# Patient Record
Sex: MALE | Race: WHITE | Employment: FULL TIME | URBAN - METROPOLITAN AREA
[De-identification: names, ages, dates, MRNs, and addresses within clinical notes are randomized per-mention and may not be internally consistent; named-entity substitution may affect disease eponyms.]

---

## 2017-02-25 ENCOUNTER — OFFICE VISIT (OUTPATIENT)
Dept: URGENT CARE | Facility: URGENT CARE | Age: 52
End: 2017-02-25
Payer: COMMERCIAL

## 2017-02-25 VITALS
HEART RATE: 76 BPM | SYSTOLIC BLOOD PRESSURE: 116 MMHG | WEIGHT: 168 LBS | DIASTOLIC BLOOD PRESSURE: 80 MMHG | OXYGEN SATURATION: 96 % | TEMPERATURE: 98.1 F

## 2017-02-25 DIAGNOSIS — J02.9 ACUTE PHARYNGITIS, UNSPECIFIED ETIOLOGY: ICD-10-CM

## 2017-02-25 DIAGNOSIS — H69.93 ETD (EUSTACHIAN TUBE DYSFUNCTION), BILATERAL: Primary | ICD-10-CM

## 2017-02-25 PROCEDURE — 99214 OFFICE O/P EST MOD 30 MIN: CPT | Performed by: PHYSICIAN ASSISTANT

## 2017-02-25 RX ORDER — CEPHALEXIN 500 MG/1
500 CAPSULE ORAL 4 TIMES DAILY
Qty: 40 CAPSULE | Refills: 0 | Status: SHIPPED | OUTPATIENT
Start: 2017-02-25

## 2017-02-25 NOTE — NURSING NOTE
Chief Complaint   Patient presents with     Ear Problem     rt ear discomfort for 5 days       Initial /80 (BP Location: Left arm, Patient Position: Chair, Cuff Size: Adult Regular)  Pulse 76  Temp 98.1  F (36.7  C) (Oral)  Wt 168 lb (76.2 kg)  SpO2 96% There is no height or weight on file to calculate BMI.  Medication Reconciliation: complete   Vianey COTA

## 2017-02-25 NOTE — MR AVS SNAPSHOT
"              After Visit Summary   2017    Keenan Cuevas    MRN: [id number]           Patient Information     Date Of Birth          1965        Visit Information        Provider Department      2017 2:45 PM Hayden Nelson PA-C [facility]        Today's Diagnoses     ETD (eustachian tube dysfunction), bilateral    -  1    Acute pharyngitis, unspecified etiology           Follow-ups after your visit        Who to contact     If you have questions or need follow up information about today's clinic visit or your schedule please contact [facility] directly at [phone].  Normal or non-critical lab and imaging results will be communicated to you by MyChart, letter or phone within 4 business days after the clinic has received the results. If you do not hear from us within 7 days, please contact the clinic through MyChart or phone. If you have a critical or abnormal lab result, we will notify you by phone as soon as possible.  Submit refill requests through [employer] or call your pharmacy and they will forward the refill request to us. Please allow 3 business days for your refill to be completed.          Additional Information About Your Visit        MyChart Information     [employer] lets you send messages to your doctor, view your test results, renew your prescriptions, schedule appointments and more. To sign up, go to [email or web address] . Click on \"Log in\" on the left side of the screen, which will take you to the Welcome page. Then click on \"Sign up Now\" on the right side of the page.     You will be asked to enter the access code listed below, as well as some personal information. Please follow the directions to create your username and password.     Your access code is: [email or web address]  Expires: 6/3/2017 10:56 PM     Your access code will  in 90 days. If you need help or a new code, please call your [facility] clinic or [phone].      "   Care EveryWhere ID     This is your Care EveryWhere ID. This could be used by other organizations to access your [facility] medical records  [id number]        Your Vitals Were     Pulse Temperature Pulse Oximetry             76 98.1  F (36.7  C) (Oral) 96%          Blood Pressure from Last 3 Encounters:   02/25/17 116/80   03/18/03 120/70    Weight from Last 3 Encounters:   02/25/17 168 lb (76.2 kg)   03/18/03 165 lb (74.8 kg)              Today, you had the following     No orders found for display         Today's Medication Changes          These changes are accurate as of: 2/25/17 11:59 PM.  If you have any questions, ask your nurse or doctor.               Start taking these medicines.        Dose/Directions    cephALEXin 500 MG capsule   Commonly known as:  KEFLEX   Used for:  Acute pharyngitis, unspecified etiology   Started by:  Hayden Nelson PA-C        Dose:  500 mg   Take 1 capsule (500 mg) by mouth 4 times daily   Quantity:  40 capsule   Refills:  0            Where to get your medicines      These medications were sent to [id number] [address] [address]     Phone:  [phone]     cephALEXin 500 MG capsule                Primary Care Provider    None Specified       No primary provider on file.        Thank you!     Thank you for choosing [facility]  for your care. Our goal is always to provide you with excellent care. Hearing back from our patients is one way we can continue to improve our services. Please take a few minutes to complete the written survey that you may receive in the mail after your visit with us. Thank you!             Your Updated Medication List - Protect others around you: Learn how to safely use, store and throw away your medicines at www.disposemymeds.org.          This list is accurate as of: 2/25/17 11:59 PM.  Always use your most recent med list.                   Brand Name  Dispense Instructions for use    cephALEXin 500 MG capsule    KEFLEX    40 capsule    Take 1 capsule (500 mg) by mouth 4 times daily       desipramine 10 MG tablet    NORPRAMIN    45 tablet    Take 1 tablet by mouth At Bedtime. Take 1-2 at bedtime for sleep       LOVASTATIN PO      Reported on 2/25/2017       OXYCODONE HCL      Reported on 2/25/2017       PRILOSEC PO      Reported on 2/25/2017       ROBITUSSIN A-C  MG/5ML Syrp   Generic drug:  CODEINE-GUAIFENESIN     4 OZ    2 TSPS PO Q4-6 HR PRN COUGH       VIREAD PO

## 2017-03-06 NOTE — PROGRESS NOTES
SUBJECTIVE:  Keenan Cuevas is a 51 year old male who presents with bilateral ear pain, fullness and pressure for 5 day(s).   Severity: mild   Timing:gradual onset  Additional symptoms include headache, post-nasal drip and sore throat.      History of recurrent otitis: no    Past Medical History   Diagnosis Date     Hep C w/o coma, chronic (H)      Current Outpatient Prescriptions   Medication Sig Dispense Refill     Tenofovir Disoproxil Fumarate (VIREAD PO)        cephALEXin (KEFLEX) 500 MG capsule Take 1 capsule (500 mg) by mouth 4 times daily 40 capsule 0     desipramine (NOPRAMIN) 10 MG tablet Take 1 tablet by mouth At Bedtime. Take 1-2 at bedtime for sleep (Patient not taking: Reported on 2/25/2017) 45 tablet 1     OXYCODONE HCL Reported on 2/25/2017       Omeprazole (PRILOSEC PO) Reported on 2/25/2017       LOVASTATIN PO Reported on 2/25/2017       ROBITUSSIN A-C  MG/5ML OR SYRP 2 TSPS PO Q4-6 HR PRN COUGH (Patient not taking: Reported on 2/25/2017) 4 OZ 1     Social History   Substance Use Topics     Smoking status: Current Some Day Smoker     Smokeless tobacco: Never Used      Comment: occ     Alcohol use Yes      Comment: occ       ROS:   Review of systems negative except as stated above.    OBJECTIVE:  /80 (BP Location: Left arm, Patient Position: Chair, Cuff Size: Adult Regular)  Pulse 76  Temp 98.1  F (36.7  C) (Oral)  Wt 168 lb (76.2 kg)  SpO2 96%   EXAM:  The right TM is gabe colored and distorted light reflex     The right auditory canal is normal and without drainage, edema or erythema  The left TM is gabe colored and distorted light reflex  The left auditory canal is normal and without drainage, edema or erythema  Oropharynx exam is normal: no lesions, erythema, adenopathy or exudate.  GENERAL: no acute distress  EYES: EOMI,  PERRL, conjunctiva clear  NECK: supple, non-tender to palpation, no adenopathy noted  RESP: lungs clear to auscultation - no rales, rhonchi or wheezes  CV:  regular rates and rhythm, normal S1 S2, no murmur noted  SKIN: no suspicious lesions or rashes     ASSESSMENT:  Eustachin tube dysfunction and URI    PLAN:    Sugessted increased rest, increased fluids and bedside humidification for comfort.  OTC tylenol and Ibuprofen for analgesia and antipyretic.  Dosed by packaging directions and weight .  Antibiotic Keflex as written.  Follow up with Primary Care Provider if any complications or sequelae present.

## 2019-10-11 ENCOUNTER — MEDICAL CORRESPONDENCE (OUTPATIENT)
Dept: HEALTH INFORMATION MANAGEMENT | Facility: CLINIC | Age: 54
End: 2019-10-11

## 2019-10-15 ENCOUNTER — HOSPITAL ENCOUNTER (OUTPATIENT)
Dept: MRI IMAGING | Facility: CLINIC | Age: 54
Discharge: HOME | End: 2019-10-15
Attending: INTERNAL MEDICINE | Admitting: INTERNAL MEDICINE
Payer: COMMERCIAL

## 2019-10-15 DIAGNOSIS — R93.2 ABNORMAL ULTRASOUND OF GALLBLADDER: ICD-10-CM

## 2019-10-15 DIAGNOSIS — B18.1 CHRONIC HEPATITIS B VIRUS INFECTION (H): ICD-10-CM

## 2019-10-15 PROCEDURE — 25500064 ZZH RX 255 OP 636: Performed by: INTERNAL MEDICINE

## 2019-10-15 PROCEDURE — 74183 MRI ABD W/O CNTR FLWD CNTR: CPT

## 2019-10-15 PROCEDURE — A9585 GADOBUTROL INJECTION: HCPCS | Performed by: INTERNAL MEDICINE

## 2019-10-15 RX ORDER — GADOBUTROL 604.72 MG/ML
7.5 INJECTION INTRAVENOUS ONCE
Status: COMPLETED | OUTPATIENT
Start: 2019-10-15 | End: 2019-10-15

## 2019-10-15 RX ADMIN — GADOBUTROL 7.5 ML: 604.72 INJECTION INTRAVENOUS at 09:02

## 2020-04-16 DIAGNOSIS — B18.1 HEPATITIS B CARRIER (H): Primary | ICD-10-CM

## 2020-04-21 ENCOUNTER — HOSPITAL ENCOUNTER (OUTPATIENT)
Dept: MRI IMAGING | Facility: CLINIC | Age: 55
End: 2020-04-21
Attending: INTERNAL MEDICINE
Payer: COMMERCIAL

## 2020-04-21 ENCOUNTER — HOSPITAL ENCOUNTER (OUTPATIENT)
Dept: LAB | Facility: CLINIC | Age: 55
End: 2020-04-21
Attending: INTERNAL MEDICINE
Payer: COMMERCIAL

## 2020-04-21 DIAGNOSIS — B18.1 HEPATITIS B CARRIER (H): ICD-10-CM

## 2020-04-21 DIAGNOSIS — R93.2 ABNORMAL FINDINGS ON DIAGNOSTIC IMAGING OF LIVER: ICD-10-CM

## 2020-04-21 LAB
IRON SATN MFR SERPL: 21 % (ref 15–46)
IRON SERPL-MCNC: 79 UG/DL (ref 35–180)
TIBC SERPL-MCNC: 379 UG/DL (ref 240–430)

## 2020-04-21 PROCEDURE — A9585 GADOBUTROL INJECTION: HCPCS | Performed by: INTERNAL MEDICINE

## 2020-04-21 PROCEDURE — 86706 HEP B SURFACE ANTIBODY: CPT | Performed by: INTERNAL MEDICINE

## 2020-04-21 PROCEDURE — 83516 IMMUNOASSAY NONANTIBODY: CPT | Performed by: INTERNAL MEDICINE

## 2020-04-21 PROCEDURE — 83550 IRON BINDING TEST: CPT | Performed by: INTERNAL MEDICINE

## 2020-04-21 PROCEDURE — 87341 HEP B SURFACE AG NEUTRLZJ IA: CPT | Performed by: INTERNAL MEDICINE

## 2020-04-21 PROCEDURE — 74183 MRI ABD W/O CNTR FLWD CNTR: CPT

## 2020-04-21 PROCEDURE — 83540 ASSAY OF IRON: CPT | Performed by: INTERNAL MEDICINE

## 2020-04-21 PROCEDURE — 25500064 ZZH RX 255 OP 636: Performed by: INTERNAL MEDICINE

## 2020-04-21 PROCEDURE — 87340 HEPATITIS B SURFACE AG IA: CPT | Performed by: INTERNAL MEDICINE

## 2020-04-21 PROCEDURE — 86038 ANTINUCLEAR ANTIBODIES: CPT | Performed by: INTERNAL MEDICINE

## 2020-04-21 PROCEDURE — 86803 HEPATITIS C AB TEST: CPT | Performed by: INTERNAL MEDICINE

## 2020-04-21 PROCEDURE — 36415 COLL VENOUS BLD VENIPUNCTURE: CPT | Performed by: INTERNAL MEDICINE

## 2020-04-21 RX ORDER — GADOBUTROL 604.72 MG/ML
7.5 INJECTION INTRAVENOUS ONCE
Status: COMPLETED | OUTPATIENT
Start: 2020-04-21 | End: 2020-04-21

## 2020-04-21 RX ADMIN — GADOBUTROL 7.5 ML: 604.72 INJECTION INTRAVENOUS at 09:55

## 2020-04-22 LAB
HBV SURFACE AB SERPL IA-ACNC: 0 M[IU]/ML
HBV SURFACE AG SERPL QL IA: REACTIVE
HCV AB SERPL QL IA: REACTIVE

## 2020-04-23 LAB
ANA SER QL IF: NEGATIVE
SMA IGG SER-ACNC: 4 UNITS (ref 0–19)

## 2020-09-21 ENCOUNTER — MEDICAL CORRESPONDENCE (OUTPATIENT)
Dept: HEALTH INFORMATION MANAGEMENT | Facility: CLINIC | Age: 55
End: 2020-09-21

## 2020-09-22 DIAGNOSIS — K74.69 LIVER, CIRRHOSIS, PORTAL (H): ICD-10-CM

## 2020-09-22 DIAGNOSIS — B18.1 CHRONIC HEPATITIS B (H): Primary | ICD-10-CM

## 2020-10-21 ENCOUNTER — HOSPITAL ENCOUNTER (OUTPATIENT)
Dept: LAB | Facility: CLINIC | Age: 55
End: 2020-10-21
Attending: NURSE PRACTITIONER
Payer: COMMERCIAL

## 2020-10-21 ENCOUNTER — HOSPITAL ENCOUNTER (OUTPATIENT)
Dept: ULTRASOUND IMAGING | Facility: CLINIC | Age: 55
End: 2020-10-21
Attending: NURSE PRACTITIONER
Payer: COMMERCIAL

## 2020-10-21 DIAGNOSIS — K74.69 LIVER, CIRRHOSIS, PORTAL (H): ICD-10-CM

## 2020-10-21 DIAGNOSIS — B18.1 CHRONIC HEPATITIS B (H): ICD-10-CM

## 2020-10-21 DIAGNOSIS — B18.1 HEPATITIS B CARRIER (H): ICD-10-CM

## 2020-10-21 DIAGNOSIS — K74.69 FLORID CIRRHOSIS (H): ICD-10-CM

## 2020-10-21 LAB
AFP SERPL-MCNC: 9.7 UG/L (ref 0–8)
ALT SERPL W P-5'-P-CCNC: 46 U/L (ref 0–70)

## 2020-10-21 PROCEDURE — 36415 COLL VENOUS BLD VENIPUNCTURE: CPT | Performed by: NURSE PRACTITIONER

## 2020-10-21 PROCEDURE — 82105 ALPHA-FETOPROTEIN SERUM: CPT | Performed by: NURSE PRACTITIONER

## 2020-10-21 PROCEDURE — 87350 HEPATITIS BE AG IA: CPT | Performed by: NURSE PRACTITIONER

## 2020-10-21 PROCEDURE — 84460 ALANINE AMINO (ALT) (SGPT): CPT | Performed by: NURSE PRACTITIONER

## 2020-10-21 PROCEDURE — 76705 ECHO EXAM OF ABDOMEN: CPT

## 2020-10-23 LAB — HBV E AG SERPL QL IA: ABNORMAL

## 2021-02-26 DIAGNOSIS — B18.1 CHRONIC HEPATITIS B (H): Primary | ICD-10-CM

## 2021-03-08 ENCOUNTER — HOSPITAL ENCOUNTER (OUTPATIENT)
Dept: LAB | Facility: CLINIC | Age: 56
End: 2021-03-08
Attending: INTERNAL MEDICINE
Payer: COMMERCIAL

## 2021-03-08 ENCOUNTER — HOSPITAL ENCOUNTER (OUTPATIENT)
Dept: MRI IMAGING | Facility: CLINIC | Age: 56
End: 2021-03-08
Attending: INTERNAL MEDICINE
Payer: COMMERCIAL

## 2021-03-08 DIAGNOSIS — B18.1 CHRONIC HEPATITIS B (H): ICD-10-CM

## 2021-03-08 DIAGNOSIS — B19.10 HEPATITIS B: ICD-10-CM

## 2021-03-08 LAB
AFP SERPL-MCNC: 8.4 UG/L (ref 0–8)
ALBUMIN SERPL-MCNC: 3.6 G/DL (ref 3.4–5)
ALP SERPL-CCNC: 79 U/L (ref 40–150)
ALT SERPL W P-5'-P-CCNC: 35 U/L (ref 0–70)
AST SERPL W P-5'-P-CCNC: 24 U/L (ref 0–45)
BASOPHILS # BLD AUTO: 0.1 10E9/L (ref 0–0.2)
BASOPHILS NFR BLD AUTO: 0.8 %
BILIRUB DIRECT SERPL-MCNC: <0.1 MG/DL (ref 0–0.2)
BILIRUB SERPL-MCNC: 0.3 MG/DL (ref 0.2–1.3)
CREAT SERPL-MCNC: 1 MG/DL (ref 0.66–1.25)
DIFFERENTIAL METHOD BLD: ABNORMAL
EOSINOPHIL # BLD AUTO: 0.3 10E9/L (ref 0–0.7)
EOSINOPHIL NFR BLD AUTO: 3.9 %
ERYTHROCYTE [DISTWIDTH] IN BLOOD BY AUTOMATED COUNT: 14 % (ref 10–15)
GFR SERPL CREATININE-BSD FRML MDRD: 85 ML/MIN/{1.73_M2}
HCT VFR BLD AUTO: 45.1 % (ref 40–53)
HGB BLD-MCNC: 14.1 G/DL (ref 13.3–17.7)
IMM GRANULOCYTES # BLD: 0 10E9/L (ref 0–0.4)
IMM GRANULOCYTES NFR BLD: 0.4 %
LYMPHOCYTES # BLD AUTO: 2.1 10E9/L (ref 0.8–5.3)
LYMPHOCYTES NFR BLD AUTO: 27.2 %
MCH RBC QN AUTO: 30.3 PG (ref 26.5–33)
MCHC RBC AUTO-ENTMCNC: 31.3 G/DL (ref 31.5–36.5)
MCV RBC AUTO: 97 FL (ref 78–100)
MONOCYTES # BLD AUTO: 0.5 10E9/L (ref 0–1.3)
MONOCYTES NFR BLD AUTO: 6.7 %
NEUTROPHILS # BLD AUTO: 4.8 10E9/L (ref 1.6–8.3)
NEUTROPHILS NFR BLD AUTO: 61 %
NRBC # BLD AUTO: 0 10*3/UL
NRBC BLD AUTO-RTO: 0 /100
PLATELET # BLD AUTO: 226 10E9/L (ref 150–450)
PROT SERPL-MCNC: 6.8 G/DL (ref 6.8–8.8)
RBC # BLD AUTO: 4.65 10E12/L (ref 4.4–5.9)
WBC # BLD AUTO: 7.8 10E9/L (ref 4–11)

## 2021-03-08 PROCEDURE — 36415 COLL VENOUS BLD VENIPUNCTURE: CPT | Performed by: INTERNAL MEDICINE

## 2021-03-08 PROCEDURE — 85025 COMPLETE CBC W/AUTO DIFF WBC: CPT | Performed by: INTERNAL MEDICINE

## 2021-03-08 PROCEDURE — A9585 GADOBUTROL INJECTION: HCPCS | Performed by: INTERNAL MEDICINE

## 2021-03-08 PROCEDURE — 80076 HEPATIC FUNCTION PANEL: CPT | Performed by: INTERNAL MEDICINE

## 2021-03-08 PROCEDURE — 82105 ALPHA-FETOPROTEIN SERUM: CPT | Performed by: INTERNAL MEDICINE

## 2021-03-08 PROCEDURE — 255N000002 HC RX 255 OP 636: Performed by: INTERNAL MEDICINE

## 2021-03-08 PROCEDURE — 82565 ASSAY OF CREATININE: CPT | Performed by: INTERNAL MEDICINE

## 2021-03-08 PROCEDURE — 74183 MRI ABD W/O CNTR FLWD CNTR: CPT

## 2021-03-08 PROCEDURE — 87350 HEPATITIS BE AG IA: CPT | Performed by: INTERNAL MEDICINE

## 2021-03-08 PROCEDURE — 86707 HEPATITIS BE ANTIBODY: CPT | Performed by: INTERNAL MEDICINE

## 2021-03-08 PROCEDURE — 87517 HEPATITIS B DNA QUANT: CPT | Performed by: INTERNAL MEDICINE

## 2021-03-08 RX ORDER — GADOBUTROL 604.72 MG/ML
7.5 INJECTION INTRAVENOUS ONCE
Status: COMPLETED | OUTPATIENT
Start: 2021-03-08 | End: 2021-03-08

## 2021-03-08 RX ADMIN — GADOBUTROL 7.5 ML: 604.72 INJECTION INTRAVENOUS at 08:41

## 2021-03-09 LAB
HBV DNA SERPL NAA+PROBE-ACNC: NORMAL [IU]/ML
HBV DNA SERPL NAA+PROBE-LOG IU: NORMAL {LOG_IU}/ML

## 2021-03-10 LAB
HBV E AB SERPL QL IA: POSITIVE
HBV E AG SERPL QL IA: ABNORMAL

## 2023-07-26 ENCOUNTER — APPOINTMENT (OUTPATIENT)
Dept: URBAN - METROPOLITAN AREA CLINIC 252 | Age: 58
Setting detail: DERMATOLOGY
End: 2023-07-27

## 2023-07-26 VITALS — RESPIRATION RATE: 16 BRPM | WEIGHT: 150 LBS | HEIGHT: 64 IN

## 2023-07-26 DIAGNOSIS — L28.1 PRURIGO NODULARIS: ICD-10-CM

## 2023-07-26 DIAGNOSIS — B35.6 TINEA CRURIS: ICD-10-CM

## 2023-07-26 PROCEDURE — OTHER PRESCRIPTION: OTHER

## 2023-07-26 PROCEDURE — 99203 OFFICE O/P NEW LOW 30 MIN: CPT

## 2023-07-26 PROCEDURE — OTHER COUNSELING: OTHER

## 2023-07-26 RX ORDER — KETOCONAZOLE 20 MG/G
CREAM TOPICAL BID
Qty: 60 | Refills: 1 | Status: ERX | COMMUNITY
Start: 2023-07-26

## 2023-07-26 RX ORDER — METHYLPREDNISOLONE 4 MG/1
TABLET ORAL
Qty: 1 | Refills: 0 | Status: ERX | COMMUNITY
Start: 2023-07-26

## 2023-07-26 RX ORDER — CLOBETASOL PROPIONATE 0.5 MG/G
CREAM TOPICAL BID
Qty: 60 | Refills: 1 | Status: ERX | COMMUNITY
Start: 2023-07-26

## 2023-07-26 ASSESSMENT — LOCATION DETAILED DESCRIPTION DERM
LOCATION DETAILED: RIGHT ANTERIOR PROXIMAL UPPER ARM
LOCATION DETAILED: LEFT ANTERIOR PROXIMAL UPPER ARM
LOCATION DETAILED: LEFT MEDIAL TRAPEZIAL NECK
LOCATION DETAILED: RIGHT AXILLARY VAULT
LOCATION DETAILED: LEFT BUTTOCK
LOCATION DETAILED: RIGHT ANTECUBITAL SKIN
LOCATION DETAILED: GENITALIA

## 2023-07-26 ASSESSMENT — LOCATION SIMPLE DESCRIPTION DERM
LOCATION SIMPLE: RIGHT UPPER ARM
LOCATION SIMPLE: RIGHT AXILLARY VAULT
LOCATION SIMPLE: POSTERIOR NECK
LOCATION SIMPLE: LEFT BUTTOCK
LOCATION SIMPLE: GENITALIA
LOCATION SIMPLE: RIGHT ELBOW
LOCATION SIMPLE: LEFT UPPER ARM

## 2023-07-26 ASSESSMENT — LOCATION ZONE DERM
LOCATION ZONE: GENITALIA
LOCATION ZONE: AXILLAE
LOCATION ZONE: ARM
LOCATION ZONE: TRUNK
LOCATION ZONE: NECK

## 2023-07-26 NOTE — HPI: RASH
What Type Of Note Output Would You Prefer (Optional)?: Bullet Format
How Severe Is Your Rash?: moderate
Is This A New Presentation, Or A Follow-Up?: Rash
Additional History: Hx of cyst , states using Triamcinolone cream given by a different dermatologist

## 2023-08-04 ENCOUNTER — APPOINTMENT (OUTPATIENT)
Dept: URBAN - METROPOLITAN AREA CLINIC 252 | Age: 58
Setting detail: DERMATOLOGY
End: 2023-08-04

## 2023-08-04 VITALS — WEIGHT: 150 LBS | HEIGHT: 64 IN

## 2023-08-04 DIAGNOSIS — L28.1 PRURIGO NODULARIS: ICD-10-CM

## 2023-08-04 DIAGNOSIS — L20.89 OTHER ATOPIC DERMATITIS: ICD-10-CM

## 2023-08-04 PROCEDURE — OTHER PRESCRIPTION: OTHER

## 2023-08-04 PROCEDURE — OTHER DUPIXENT INJECTION: OTHER

## 2023-08-04 PROCEDURE — OTHER DUPIXENT MONITORING: OTHER

## 2023-08-04 PROCEDURE — 99214 OFFICE O/P EST MOD 30 MIN: CPT | Mod: 25

## 2023-08-04 PROCEDURE — OTHER COUNSELING: OTHER

## 2023-08-04 RX ORDER — DUPILUMAB 300 MG/2ML
300MG INJECTION, SOLUTION SUBCUTANEOUS EVERY OTHER WEEK
Qty: 2 | Refills: 0 | Status: ERX | COMMUNITY
Start: 2023-08-04

## 2023-08-04 RX ORDER — TRIAMCINOLONE ACETONIDE 0.25 MG/G
0.025% OINTMENT TOPICAL BID
Qty: 454 | Refills: 1 | Status: ERX | COMMUNITY
Start: 2023-08-04

## 2023-08-04 RX ADMIN — DUPILUMAB 300MG: 300 INJECTION, SOLUTION SUBCUTANEOUS at 00:00:00

## 2023-08-04 ASSESSMENT — LOCATION DETAILED DESCRIPTION DERM
LOCATION DETAILED: RIGHT DISTAL DORSAL FOREARM
LOCATION DETAILED: LEFT PROXIMAL DORSAL FOREARM
LOCATION DETAILED: RIGHT ANTERIOR DISTAL THIGH
LOCATION DETAILED: RIGHT AXILLARY VAULT
LOCATION DETAILED: SUPERIOR LUMBAR SPINE
LOCATION DETAILED: LEFT ANTERIOR DISTAL THIGH

## 2023-08-04 ASSESSMENT — LOCATION SIMPLE DESCRIPTION DERM
LOCATION SIMPLE: RIGHT FOREARM
LOCATION SIMPLE: RIGHT AXILLARY VAULT
LOCATION SIMPLE: LEFT THIGH
LOCATION SIMPLE: LEFT FOREARM
LOCATION SIMPLE: RIGHT THIGH
LOCATION SIMPLE: LOWER BACK

## 2023-08-04 ASSESSMENT — LOCATION ZONE DERM
LOCATION ZONE: AXILLAE
LOCATION ZONE: ARM
LOCATION ZONE: TRUNK
LOCATION ZONE: LEG

## 2023-08-04 ASSESSMENT — ITCH INTENSITY: HOW SEVERE IS YOUR ITCHING?: 8

## 2023-08-04 NOTE — PROCEDURE: DUPIXENT INJECTION
Additional Comments: Additional sample box given to patient today while pending approval of prior authorization.

## 2023-08-04 NOTE — PROCEDURE: COUNSELING
Detail Level: Zone
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  \\n- The lesions will only resolve if they stop getting traumatized.\\n- Discussed breaking the itch-scratch cycle. \\n- Discussed the option to use Dupixent.\\n- Patient to discontinue use of Ketoconazole.\\n- Patient to continue use of Clobetasol.
Detail Level: Simple

## 2023-08-04 NOTE — HPI: RASH
Is This A New Presentation, Or A Follow-Up?: Rash
Additional History: History of hypo-pigmented scars from scratching on. Last was on medrol 1 week ago. Using clobetasol and ketoconazole bid.  He reports hepatitis B history. Denies history of eye symptoms.

## 2023-08-15 ENCOUNTER — APPOINTMENT (OUTPATIENT)
Dept: URBAN - METROPOLITAN AREA CLINIC 252 | Age: 58
Setting detail: DERMATOLOGY
End: 2023-08-15

## 2023-08-15 VITALS — WEIGHT: 150 LBS | HEIGHT: 64 IN

## 2023-08-15 DIAGNOSIS — L28.1 PRURIGO NODULARIS: ICD-10-CM

## 2023-08-15 DIAGNOSIS — L20.89 OTHER ATOPIC DERMATITIS: ICD-10-CM

## 2023-08-15 DIAGNOSIS — S31000A OPEN WOUND(S) (MULTIPLE) OF UNSPECIFIED SITE(S), WITHOUT MENTION OF COMPLICATION: ICD-10-CM

## 2023-08-15 PROBLEM — T07.XXXA UNSPECIFIED MULTIPLE INJURIES, INITIAL ENCOUNTER: Status: ACTIVE | Noted: 2023-08-15

## 2023-08-15 PROCEDURE — OTHER COUNSELING: OTHER

## 2023-08-15 PROCEDURE — OTHER PRESCRIPTION MEDICATION MANAGEMENT: OTHER

## 2023-08-15 PROCEDURE — 99214 OFFICE O/P EST MOD 30 MIN: CPT

## 2023-08-15 ASSESSMENT — LOCATION ZONE DERM
LOCATION ZONE: TRUNK
LOCATION ZONE: AXILLAE
LOCATION ZONE: LEG
LOCATION ZONE: ARM

## 2023-08-15 ASSESSMENT — LOCATION SIMPLE DESCRIPTION DERM
LOCATION SIMPLE: RIGHT POSTERIOR AXILLA
LOCATION SIMPLE: RIGHT FOREARM
LOCATION SIMPLE: LEFT ELBOW
LOCATION SIMPLE: RIGHT THIGH
LOCATION SIMPLE: LEFT FOREARM
LOCATION SIMPLE: LOWER BACK

## 2023-08-15 ASSESSMENT — LOCATION DETAILED DESCRIPTION DERM
LOCATION DETAILED: RIGHT PROXIMAL DORSAL FOREARM
LOCATION DETAILED: LEFT PROXIMAL DORSAL FOREARM
LOCATION DETAILED: INFERIOR LUMBAR SPINE
LOCATION DETAILED: RIGHT POSTERIOR AXILLA
LOCATION DETAILED: RIGHT ANTERIOR PROXIMAL THIGH
LOCATION DETAILED: LEFT ELBOW

## 2023-08-15 NOTE — HPI: RASH
How Severe Is Your Rash?: mild
Is This A New Presentation, Or A Follow-Up?: Rash
Additional History: Started Dupixent for pn and atopic dermatitis almost 2 weeks ago. Developed a redness on the left elbow. No tenderness.

## 2023-08-15 NOTE — PROCEDURE: COUNSELING
Patient Specific Counseling (Will Not Stick From Patient To Patient): - Recommend applying Clobetasol to affected area on left elbow (patient has Clobetasol at home).\\n- Discussed proper use of topical steroids.\\n- Patient to continue Dupixent (PA approved).\\n- Patient due for his Dupixent injection on 8/18/23.
Detail Level: Simple
Patient Specific Counseling (Will Not Stick From Patient To Patient): - Patient to keep wound covered with a bandage. Scratches wound. Discussed wound care, recommended clobetasol for itch as needed. Patient expressed understanding.
Detail Level: Detailed
Patient Specific Counseling (Will Not Stick From Patient To Patient): - Patient to continue Dupixent (PA approved).\\n- Patient due for his Dupixent injection on 8/18/23.
Detail Level: Zone

## 2023-08-16 RX ORDER — DUPILUMAB 300 MG/2ML
300MG INJECTION, SOLUTION SUBCUTANEOUS EVERY OTHER WEEK
Qty: 2 | Refills: 0 | Status: ERX

## 2023-08-21 ENCOUNTER — RX ONLY (RX ONLY)
Age: 58
End: 2023-08-21

## 2023-08-21 RX ORDER — TRIAMCINOLONE ACETONIDE 1 MG/G
CREAM TOPICAL
Qty: 453.6 | Refills: 0 | Status: ERX | COMMUNITY
Start: 2023-08-21

## 2023-08-24 ENCOUNTER — APPOINTMENT (OUTPATIENT)
Dept: URBAN - METROPOLITAN AREA CLINIC 252 | Age: 58
Setting detail: DERMATOLOGY
End: 2023-08-25

## 2023-08-24 VITALS — WEIGHT: 150 LBS | HEIGHT: 64 IN

## 2023-08-24 DIAGNOSIS — L20.89 OTHER ATOPIC DERMATITIS: ICD-10-CM

## 2023-08-24 DIAGNOSIS — L27.0 GENERALIZED SKIN ERUPTION DUE TO DRUGS AND MEDICAMENTS TAKEN INTERNALLY: ICD-10-CM

## 2023-08-24 PROCEDURE — OTHER COUNSELING: OTHER

## 2023-08-24 PROCEDURE — OTHER PRESCRIPTION MEDICATION MANAGEMENT: OTHER

## 2023-08-24 PROCEDURE — OTHER ADDITIONAL NOTES: OTHER

## 2023-08-24 PROCEDURE — 99214 OFFICE O/P EST MOD 30 MIN: CPT

## 2023-08-24 ASSESSMENT — LOCATION SIMPLE DESCRIPTION DERM
LOCATION SIMPLE: LEFT THIGH
LOCATION SIMPLE: RIGHT FOREARM
LOCATION SIMPLE: RIGHT ELBOW
LOCATION SIMPLE: LEFT ELBOW
LOCATION SIMPLE: RIGHT THIGH

## 2023-08-24 ASSESSMENT — LOCATION ZONE DERM
LOCATION ZONE: ARM
LOCATION ZONE: ARM
LOCATION ZONE: LEG

## 2023-08-24 ASSESSMENT — LOCATION DETAILED DESCRIPTION DERM
LOCATION DETAILED: RIGHT ELBOW
LOCATION DETAILED: LEFT ELBOW
LOCATION DETAILED: RIGHT ANTERIOR PROXIMAL THIGH
LOCATION DETAILED: LEFT ANTERIOR PROXIMAL THIGH
LOCATION DETAILED: RIGHT PROXIMAL DORSAL FOREARM

## 2023-08-24 NOTE — PROCEDURE: COUNSELING
Detail Level: Detailed
Patient Specific Counseling (Will Not Stick From Patient To Patient): - Discussed proper use of topical steroids.\\n- Patient is deferring any treatment for right now. He would like to do research on Adbry before starting the process for it. Do not resume Dupixent due to hives which are mostly resolved with prednisone. \\n- Patient wanted more information on oral medications instead of injections, however since he has hepatitis B we will contact his Dr that treats him for this and discuss the possibility of oral medications but would not consider until trial of Adbry.
Detail Level: Simple

## 2023-08-24 NOTE — PROCEDURE: ADDITIONAL NOTES
Render Risk Assessment In Note?: no
Detail Level: Detailed
Additional Notes: - Patient was prescribed topical steroids by ER. Counseled patient on only using the medication for 2 weeks at a time for each specific area.\\n\\n- Patient was prescribed prednisone at the ER. He has taken his last tablet today, if there is no improvement the next couple days, patient may call for a 16 day course of prednisone.

## 2023-08-24 NOTE — HPI: RASH
Additional History: Got muscle and joint aches and ultimatley hives. Was seen in urgent care and was given prednisone for 7 days and  finished last dose today Dupixent was doing really well. The hives showed up shortly after last office visit. Has a history of hepatitis b. He is treated by Dr. Martinez at [facility]. Additional History: Got muscle and joint aches and ultimatley hives. Was seen in urgent care and was given prednisone for 7 days and  finished last dose today Dupixent was doing really well. The hives showed up shortly after last office visit. Has a history of hepatitis b. He is treated by Dr. Martinez at [facility].

## 2023-08-25 ENCOUNTER — RX ONLY (RX ONLY)
Age: 58
End: 2023-08-25

## 2023-08-25 RX ORDER — PREDNISONE 20 MG/1
TABLET ORAL
Qty: 26 | Refills: 0 | Status: ERX | COMMUNITY
Start: 2023-08-25

## 2023-10-05 ENCOUNTER — RX ONLY (RX ONLY)
Age: 58
End: 2023-10-05

## 2023-10-05 RX ORDER — TACROLIMUS 1 MG/G
OINTMENT TOPICAL
Qty: 30 | Refills: 0 | Status: ERX | COMMUNITY
Start: 2023-10-05

## 2023-10-13 ENCOUNTER — APPOINTMENT (OUTPATIENT)
Dept: URBAN - METROPOLITAN AREA CLINIC 252 | Age: 58
Setting detail: DERMATOLOGY
End: 2023-10-13

## 2023-10-13 VITALS — HEIGHT: 65 IN | WEIGHT: 155 LBS

## 2023-10-13 DIAGNOSIS — L08.9 LOCAL INFECTION OF THE SKIN AND SUBCUTANEOUS TISSUE, UNSPECIFIED: ICD-10-CM

## 2023-10-13 PROCEDURE — 99212 OFFICE O/P EST SF 10 MIN: CPT

## 2023-10-13 PROCEDURE — OTHER PRESCRIPTION: OTHER

## 2023-10-13 PROCEDURE — OTHER ORDER TESTS: OTHER

## 2023-10-13 PROCEDURE — OTHER COUNSELING: OTHER

## 2023-10-13 PROCEDURE — OTHER SILVER NITRATE: OTHER

## 2023-10-13 PROCEDURE — OTHER PHOTO-DOCUMENTATION: OTHER

## 2023-10-13 RX ORDER — DOXYCYCLINE 100 MG/1
CAPSULE ORAL BID
Qty: 14 | Refills: 0 | Status: ERX | COMMUNITY
Start: 2023-10-13

## 2023-10-13 ASSESSMENT — LOCATION DETAILED DESCRIPTION DERM: LOCATION DETAILED: RIGHT POSTERIOR AXILLA

## 2023-10-13 ASSESSMENT — LOCATION ZONE DERM: LOCATION ZONE: AXILLAE

## 2023-10-13 ASSESSMENT — LOCATION SIMPLE DESCRIPTION DERM: LOCATION SIMPLE: RIGHT POSTERIOR AXILLA

## 2023-10-13 NOTE — PROCEDURE: COUNSELING
Detail Level: Detailed
Patient Specific Counseling (Will Not Stick From Patient To Patient): Discussed this may be infected. Recommended empiric antibiotic orally, culture, silver nitrate application, and duoderm. Sent patient home with a large duoderm that can be cut into 9 pieces, advised he can leave in place until saturated or replace weekly, discontinue tacrolimus or any other ointments for now. Patient expressed understanding.

## 2023-10-13 NOTE — PROCEDURE: ORDER TESTS
Performing Laboratory: [id number]
Lab Facility: [id number]
Billing Type: Third-Party Bill
Bill For Surgical Tray: no
Expected Date Of Service: 10/13/2023

## 2023-10-13 NOTE — PROCEDURE: SILVER NITRATE
Detail Level: Detailed
Add 56187 Code?: No
Procedure Details: A silver nitrate stick was used for chemical cautery.

## 2023-10-13 NOTE — HPI: SKIN LESION
How Severe Is Your Skin Lesion?: moderate
Is This A New Presentation, Or A Follow-Up?: Skin Lesion
Additional History: He is sensitive to bandaids but tries to keep covered.

## 2023-10-24 ENCOUNTER — APPOINTMENT (OUTPATIENT)
Dept: URBAN - METROPOLITAN AREA CLINIC 252 | Age: 58
Setting detail: DERMATOLOGY
End: 2023-10-24

## 2023-10-24 VITALS — WEIGHT: 155 LBS | HEIGHT: 65 IN

## 2023-10-24 DIAGNOSIS — L98429 CHRONIC ULCER OF OTHER SPECIFIED SITES: ICD-10-CM

## 2023-10-24 DIAGNOSIS — L98419 CHRONIC ULCER OF OTHER SPECIFIED SITES: ICD-10-CM

## 2023-10-24 DIAGNOSIS — L28.1 PRURIGO NODULARIS: ICD-10-CM

## 2023-10-24 PROBLEM — L98.499 NON-PRESSURE CHRONIC ULCER OF SKIN OF OTHER SITES WITH UNSPECIFIED SEVERITY: Status: ACTIVE | Noted: 2023-10-24

## 2023-10-24 PROBLEM — NODX: Status: ACTIVE | Noted: 2023-10-24

## 2023-10-24 PROCEDURE — OTHER MIPS QUALITY: OTHER

## 2023-10-24 PROCEDURE — OTHER PRESCRIPTION MEDICATION MANAGEMENT: OTHER

## 2023-10-24 PROCEDURE — OTHER ADDITIONAL NOTES: OTHER

## 2023-10-24 PROCEDURE — 99214 OFFICE O/P EST MOD 30 MIN: CPT

## 2023-10-24 PROCEDURE — OTHER COUNSELING: OTHER

## 2023-10-24 ASSESSMENT — LOCATION DETAILED DESCRIPTION DERM
LOCATION DETAILED: RIGHT POSTERIOR AXILLA
LOCATION DETAILED: RIGHT SUPERIOR UPPER BACK
LOCATION DETAILED: STERNUM

## 2023-10-24 ASSESSMENT — LOCATION ZONE DERM
LOCATION ZONE: TRUNK
LOCATION ZONE: AXILLAE

## 2023-10-24 ASSESSMENT — LOCATION SIMPLE DESCRIPTION DERM
LOCATION SIMPLE: RIGHT POSTERIOR AXILLA
LOCATION SIMPLE: CHEST
LOCATION SIMPLE: RIGHT UPPER BACK

## 2023-10-24 NOTE — PROCEDURE: PRESCRIPTION MEDICATION MANAGEMENT
Render In Strict Bullet Format?: No
Continue Regimen: Topical triamcinolone 0.1% cream BID as needed for flares.
Detail Level: Zone

## 2023-10-24 NOTE — PROCEDURE: ADDITIONAL NOTES
Detail Level: Detailed
Render Risk Assessment In Note?: no
Additional Notes: Duoderm bandage replaced today in clinic. \\nInstructed the patient to continue using duoderm bandages until they can be removed without debris.
Additional Notes: Discussed Adbry again during today’s visit. Patient is currently able to control his condition with topical Triamcinolone. Informed patient that if this condition begins to get out of control and he is no longer able to manage with topical treatment, patient is to RTC to start on samples of Adbry. If patient sees improvement while on Adbry paperwork will be filled out and sent to his insurance for medication approval.

## 2023-10-24 NOTE — HPI: RASH
Is This A New Presentation, Or A Follow-Up?: Rash
Additional History: Stopped Dupixent due to possible allergy. He was hesistant to start adbry emperically. . Currently no itch. Wound has healed singificanly on right axilla but not completely.

## 2024-08-12 ENCOUNTER — APPOINTMENT (OUTPATIENT)
Dept: URBAN - METROPOLITAN AREA CLINIC 252 | Age: 59
Setting detail: DERMATOLOGY
End: 2024-08-12

## 2024-08-12 DIAGNOSIS — L70.0 ACNE VULGARIS: ICD-10-CM

## 2024-08-12 PROCEDURE — 99214 OFFICE O/P EST MOD 30 MIN: CPT

## 2024-08-12 PROCEDURE — OTHER ISOTRETINOIN INITIATION: OTHER

## 2024-08-12 PROCEDURE — OTHER ADDITIONAL NOTES: OTHER

## 2024-08-12 PROCEDURE — OTHER PRESCRIPTION: OTHER

## 2024-08-12 RX ORDER — ISOTRETINOIN 20 MG/1
20MG CAPSULE, LIQUID FILLED ORAL
Qty: 30 | Refills: 0 | Status: ERX | COMMUNITY
Start: 2024-08-12

## 2024-08-12 ASSESSMENT — LOCATION ZONE DERM: LOCATION ZONE: FACE

## 2024-08-12 ASSESSMENT — LOCATION SIMPLE DESCRIPTION DERM: LOCATION SIMPLE: RIGHT FOREHEAD

## 2024-08-12 ASSESSMENT — LOCATION DETAILED DESCRIPTION DERM: LOCATION DETAILED: RIGHT INFERIOR MEDIAL FOREHEAD

## 2024-08-12 NOTE — PROCEDURE: ADDITIONAL NOTES
Render Risk Assessment In Note?: no
Additional Notes: - Will need 8-hour fasting baseline lab work at next office visit.\\n- Patient signed an LESLI to [facility] for most recent lab work.
Detail Level: Simple

## 2024-08-12 NOTE — HPI: PIMPLES (ACNE)
Is This A New Presentation, Or A Follow-Up?: Acne
Additional Comments (Use Complete Sentences): Used isotretinoin 4 times in past and want to restart. Last used in 2014.  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 Used doxycycline several times and it helps but keeps coming back.     Has used adapalene on past.  Also tried sal acid, bpo, clindamycin. Had blood drawn about 4 months and he reports was all normal. Denies any history of depression or inflammatory bowel disease.      Denies any side effects other than dryness on isotretinoin in past. History of hepatitis C and B. He reports his his hematologist is on board with restarting isotretinoin - Dr. Moises Liriano.

## 2024-09-24 ENCOUNTER — APPOINTMENT (OUTPATIENT)
Dept: URBAN - METROPOLITAN AREA CLINIC 252 | Age: 59
Setting detail: DERMATOLOGY
End: 2024-09-24

## 2024-09-24 VITALS — WEIGHT: 155 LBS | HEIGHT: 64 IN

## 2024-09-24 DIAGNOSIS — L70.0 ACNE VULGARIS: ICD-10-CM

## 2024-09-24 PROCEDURE — OTHER VENIPUNCTURE: OTHER

## 2024-09-24 PROCEDURE — OTHER ADDITIONAL NOTES: OTHER

## 2024-09-24 PROCEDURE — 36415 COLL VENOUS BLD VENIPUNCTURE: CPT

## 2024-09-24 PROCEDURE — 99214 OFFICE O/P EST MOD 30 MIN: CPT

## 2024-09-24 PROCEDURE — OTHER ORDER TESTS: OTHER

## 2024-09-24 PROCEDURE — OTHER ISOTRETINOIN INITIATION: OTHER

## 2024-09-24 ASSESSMENT — LOCATION DETAILED DESCRIPTION DERM
LOCATION DETAILED: LEFT ANTERIOR DISTAL UPPER ARM
LOCATION DETAILED: RIGHT INFERIOR MEDIAL FOREHEAD

## 2024-09-24 ASSESSMENT — LOCATION SIMPLE DESCRIPTION DERM
LOCATION SIMPLE: LEFT UPPER ARM
LOCATION SIMPLE: RIGHT FOREHEAD

## 2024-09-24 ASSESSMENT — LOCATION ZONE DERM
LOCATION ZONE: ARM
LOCATION ZONE: FACE

## 2024-09-24 NOTE — PROCEDURE: ORDER TESTS
Lab Facility: [id number]
Expected Date Of Service: 09/24/2024
Bill For Surgical Tray: no
Performing Laboratory: [id number]
Billing Type: Third-Party Bill

## 2024-09-24 NOTE — HPI: MEDICATION (ISOTRETINOIN)
Is This A New Presentation, Or A Follow-Up?: Follow Up Isotretinoin
Additional History: Started isotretinoin 4-5 days ago. We need baseline triglycerides. He had a liver scan that he reports was good and he was cleared to restart isotretinoin. He pick at these. Stopped benzoyl peroxide after starting.  Patient is experiencing the side effect of dryness, but reports that the dryness is tolerable and mangeable. Patient is experiencing the side effect of cheilitis but this manageable. Patient denies headache, blurry vision, muscle aches, joint pains, any mood changes including denying depression/sadness and denies diarrhea or abdominal discomfort. Patient is fasting today.

## 2024-09-24 NOTE — PROCEDURE: ADDITIONAL NOTES
Additional Notes: - Patient will need to follow up in 31-37 days. Patient is aware he will be out of pills for a few days due to coming into office after starting Isotretinoin 5 days ago. \\n- Recommend patient stop using all other facial products such as benzoyl peroxide and salicylic acid while taking Isotretinoin.
Detail Level: Simple
Render Risk Assessment In Note?: no

## 2024-10-24 ENCOUNTER — APPOINTMENT (OUTPATIENT)
Dept: URBAN - METROPOLITAN AREA CLINIC 252 | Age: 59
Setting detail: DERMATOLOGY
End: 2024-10-24

## 2024-10-24 VITALS — WEIGHT: 155 LBS

## 2024-10-24 DIAGNOSIS — L70.0 ACNE VULGARIS: ICD-10-CM

## 2024-10-24 PROCEDURE — OTHER PRESCRIPTION: OTHER

## 2024-10-24 PROCEDURE — 99214 OFFICE O/P EST MOD 30 MIN: CPT

## 2024-10-24 PROCEDURE — OTHER ISOTRETINOIN MONITORING: OTHER

## 2024-10-24 RX ORDER — ISOTRETINOIN 30 MG/1
30MG CAPSULE, LIQUID FILLED ORAL TWICE PER DAY
Qty: 60 | Refills: 0 | Status: ERX | COMMUNITY
Start: 2024-10-24

## 2024-10-24 ASSESSMENT — LOCATION DETAILED DESCRIPTION DERM: LOCATION DETAILED: LEFT INFERIOR CENTRAL MALAR CHEEK

## 2024-10-24 ASSESSMENT — LOCATION ZONE DERM: LOCATION ZONE: FACE

## 2024-10-24 ASSESSMENT — LOCATION SIMPLE DESCRIPTION DERM: LOCATION SIMPLE: LEFT CHEEK

## 2024-10-24 NOTE — PROCEDURE: ISOTRETINOIN MONITORING
Cheilitis Normal Treatment: I recommended application of Vaseline or Aquaphor numerous times a day (as often as every hour) and before going to bed.
Female Completion Statement: After discussing her treatment course we decided to discontinue isotretinoin therapy at this time. I explained that she would need to continue her birth control methods for at least one month after the last dosage. She should also get a pregnancy test one month after the last dose. She shouldn't donate blood for one month after the last dose. She should call with any new symptoms of depression.
Myalgia Treatment: I explained this is common when taking isotretinoin. If this worsens they will contact us. They may try OTC ibuprofen.
What Is The Patient's Gender: Male
Show How Many Months Of Anticipated Therapy Are Left: No
Retinoid Dermatitis Aggressive Treatment: I recommended more frequent application of Cetaphil or CeraVe to the areas of dermatitis. I also prescribed a topical steroid for twice daily use until the dermatitis resolves.
Add Associated Diagnosis When Managing Medication Side Effects: Yes
Retinoid Dermatitis Normal Treatment: I recommended more frequent application of Cetaphil or CeraVe to the areas of dermatitis.
Is Cheilitis Present?: Yes - Normal Treatment
Hypertriglyceridemia Monitoring: I explained this is common when taking isotretinoin. If this worsens they will contact us.
Cheilitis Normal Treatment: - Recommended application of Vaseline ointment or Aquaphor numerous times a day (as often as every hour) and before going to bed.
Cheilitis Aggressive Treatment: I recommended application of Vaseline or Aquaphor numerous times a day (as often as every hour) and before going to bed. I also prescribed a topical steroid for twice daily use.
Use Therapeutic Ranged Or Therapeutic Target: please select Range or Target
Male Completion Statement: - After discussing his treatment course we decided to discontinue isotretinoin therapy at this time.\\n- He shouldn't donate blood for one month after the last dose. He should call with any new symptoms of depression.\\n- Discussed maintenance options.
Xerosis Aggressive Treatment: I recommended application of Cetaphil or CeraVe numerous times a day and before going to bed to all dry areas. I also prescribed a topical steroid for twice daily use.
Lower Range (In Mg/Kg): 120
Upper Range (In Mg/Kg): 150
Detail Level: Zone
Target Cumulative Dosage (In Mg/Kg): 135
Xerosis Normal Treatment: I recommended application of Cetaphil or CeraVe numerous times a day going to bed to all dry areas.
Nosebleeds Normal Treatment: I explained this is common when taking isotretinoin. I recommended saline mist in each nostril multiple times a day. If this worsens they will contact us.
Headache Monitoring: I recommended monitoring the headaches for now. There is no evidence of increased intracranial pressure. They were instructed to call if the headaches are worsening.
Hypercholesterolemia Monitoring: I explained this is common when taking isotretinoin. We will monitor closely.
Female Pregnancy Counseling Text: Female patients should also be on two forms of birth control while taking this medication and for one month after their last dose.
Xerosis Aggressive Treatment: I recommended application of Cetaphil or CeraVe numerous times a day going to bed to all dry areas. I also prescribed a topical steroid for twice daily use.
Counseling Text: I reviewed the side effect in detail. Patient should get monthly blood tests, not donate blood, not drive at night if vision affected, and not share medication.
Pounds Preamble Statement (Weight Entered In Details Tab): Reported Weight in pounds:
Next Month's Dosage: Continue Current Dosage
Xerosis Normal Treatment: - Recommended application of Cetaphil or CeraVe cream numerous times a day and before going to bed to all dry areas.
Months Of Therapy Completed: 1
Kilograms Preamble Statement (Weight Entered In Details Tab): Reported Weight in kilograms:
Weight Units: pounds

## 2024-10-24 NOTE — HPI: MEDICATION (ISOTRETINOIN)
Is This A New Presentation, Or A Follow-Up?: Follow Up Isotretinoin
Additional History: Completed 1 month isotretinoin ran out of pills 10 days ago. Patient is experiencing the side effect of dryness, but reports that the dryness is tolerable and mangeable. Patient is experiencing the side effect of cheilitis but this manageable. Patient denies headache, blurry vision, muscle aches, joint pains, any mood changes including denying depression/sadness and denies diarrhea or abdominal discomfort. This is his 5th course of isotretinoin. He report staying clear 1-2 years after each course. History of liver issues including hep b and c in last but has been cleared to be on isotretinoin. Hep c is gone and and using treatment for hep B.

## 2024-11-25 ENCOUNTER — APPOINTMENT (OUTPATIENT)
Dept: URBAN - METROPOLITAN AREA CLINIC 252 | Age: 59
Setting detail: DERMATOLOGY
End: 2024-11-26

## 2024-11-25 VITALS — WEIGHT: 150 LBS | HEIGHT: 64 IN

## 2024-11-25 DIAGNOSIS — L70.0 ACNE VULGARIS: ICD-10-CM

## 2024-11-25 PROCEDURE — 99214 OFFICE O/P EST MOD 30 MIN: CPT

## 2024-11-25 PROCEDURE — OTHER ISOTRETINOIN MONITORING: OTHER

## 2024-11-25 PROCEDURE — OTHER VENIPUNCTURE: OTHER

## 2024-11-25 PROCEDURE — OTHER PRESCRIPTION: OTHER

## 2024-11-25 PROCEDURE — OTHER ORDER TESTS: OTHER

## 2024-11-25 PROCEDURE — 36415 COLL VENOUS BLD VENIPUNCTURE: CPT

## 2024-11-25 RX ORDER — ISOTRETINOIN 30 MG/1
30MG CAPSULE, LIQUID FILLED ORAL TWICE PER DAY
Qty: 60 | Refills: 0 | Status: ERX

## 2024-11-25 ASSESSMENT — LOCATION SIMPLE DESCRIPTION DERM
LOCATION SIMPLE: LEFT CHEEK
LOCATION SIMPLE: LEFT ELBOW

## 2024-11-25 ASSESSMENT — LOCATION DETAILED DESCRIPTION DERM
LOCATION DETAILED: LEFT ANTECUBITAL SKIN
LOCATION DETAILED: LEFT CENTRAL MALAR CHEEK

## 2024-11-25 ASSESSMENT — LOCATION ZONE DERM
LOCATION ZONE: FACE
LOCATION ZONE: ARM

## 2024-11-25 NOTE — PROCEDURE: ISOTRETINOIN MONITORING
Weight Units: pounds
Counseling Text: I reviewed the side effect in detail. Patient should get monthly blood tests, not donate blood, not drive at night if vision affected, and not share medication.
Xerosis Normal Treatment: I recommended application of Cetaphil or CeraVe numerous times a day going to bed to all dry areas.
Xerosis Normal Treatment: - Recommended application of Cetaphil or CeraVe cream numerous times a day and before going to bed to all dry areas.
Upper Range (In Mg/Kg): 150
Headache Monitoring: I recommended monitoring the headaches for now. There is no evidence of increased intracranial pressure. They were instructed to call if the headaches are worsening.
Hypercholesterolemia Monitoring: I explained this is common when taking isotretinoin. We will monitor closely.
What Is The Patient's Gender: Male
Cheilitis Normal Treatment: - Recommended application of Vaseline ointment or Aquaphor numerous times a day (as often as every hour) and before going to bed.
Next Month's Dosage: Continue Current Dosage
Female Completion Statement: After discussing her treatment course we decided to discontinue isotretinoin therapy at this time. I explained that she would need to continue her birth control methods for at least one month after the last dosage. She should also get a pregnancy test one month after the last dose. She shouldn't donate blood for one month after the last dose. She should call with any new symptoms of depression.
Myalgia Monitoring: I explained this is common when taking isotretinoin. If this worsens they will contact us.
Cheilitis Aggressive Treatment: I recommended application of Vaseline or Aquaphor numerous times a day (as often as every hour) and before going to bed. I also prescribed a topical steroid for twice daily use.
Use Enhanced Counseling Feature (Automatic): No
Are Labs Available For Review?: Yes
Retinoid Dermatitis Aggressive Treatment: I recommended more frequent application of Cetaphil or CeraVe to the areas of dermatitis. I also prescribed a topical steroid for twice daily use until the dermatitis resolves.
Kilograms Preamble Statement (Weight Entered In Details Tab): Reported Weight in kilograms:
Xerosis Aggressive Treatment: I recommended application of Cetaphil or CeraVe numerous times a day and before going to bed to all dry areas. I also prescribed a topical steroid for twice daily use.
Cheilitis Normal Treatment: I recommended application of Vaseline or Aquaphor numerous times a day (as often as every hour) and before going to bed.
Is Cheilitis Present?: Yes - Normal Treatment
Hypertriglyceridemia Treatment: I explained this is common when taking isotretinoin. If this worsens they will contact us. They may try OTC ibuprofen.
Male Completion Statement: - After discussing his treatment course we decided to discontinue isotretinoin therapy at this time.\\n- He shouldn't donate blood for one month after the last dose. He should call with any new symptoms of depression.\\n- Discussed maintenance options.
Nosebleeds Normal Treatment: I explained this is common when taking isotretinoin. I recommended saline mist in each nostril multiple times a day. If this worsens they will contact us.
Lower Range (In Mg/Kg): 120
Target Cumulative Dosage (In Mg/Kg): 135
Retinoid Dermatitis Normal Treatment: I recommended more frequent application of Cetaphil or CeraVe to the areas of dermatitis.
Use Therapeutic Ranged Or Therapeutic Target: please select Range or Target
Xerosis Aggressive Treatment: I recommended application of Cetaphil or CeraVe numerous times a day going to bed to all dry areas. I also prescribed a topical steroid for twice daily use.
Months Of Therapy Completed: 2
Detail Level: Zone
Female Pregnancy Counseling Text: Female patients should also be on two forms of birth control while taking this medication and for one month after their last dose.
Pounds Preamble Statement (Weight Entered In Details Tab): Reported Weight in pounds:

## 2024-11-25 NOTE — HPI: MEDICATION (ISOTRETINOIN)
Is This A New Presentation, Or A Follow-Up?: Follow Up Isotretinoin
Additional History: Patient is fasting today. Patient is experiencing the side effect of dryness, but reports that the dryness is tolerable and mangeable. Patient is experiencing the side effect of cheilitis but this manageable. Patient denies headache, blurry vision, muscle aches, joint pains, any mood changes including denying depression/sadness and denies diarrhea or abdominal discomfort. He continues to pick at skin. Patient is fasting today. He reports it is working better than it ever has in the past.

## 2024-11-25 NOTE — PROCEDURE: ORDER TESTS
Bill For Surgical Tray: no
Expected Date Of Service: 11/25/2024
Billing Type: Third-Party Bill
Performing Laboratory: [id number]

## 2024-12-26 ENCOUNTER — APPOINTMENT (OUTPATIENT)
Dept: URBAN - METROPOLITAN AREA CLINIC 252 | Age: 59
Setting detail: DERMATOLOGY
End: 2024-12-26

## 2024-12-26 VITALS — HEIGHT: 64 IN | WEIGHT: 150 LBS

## 2024-12-26 VITALS — WEIGHT: 150 LBS | HEIGHT: 64 IN

## 2024-12-26 DIAGNOSIS — L70.0 ACNE VULGARIS: ICD-10-CM

## 2024-12-26 PROCEDURE — OTHER PRESCRIPTION: OTHER

## 2024-12-26 PROCEDURE — OTHER ISOTRETINOIN MONITORING: OTHER

## 2024-12-26 PROCEDURE — 99214 OFFICE O/P EST MOD 30 MIN: CPT

## 2024-12-26 RX ORDER — ISOTRETINOIN 30 MG/1
30MG CAPSULE, LIQUID FILLED ORAL TWICE PER DAY
Qty: 60 | Refills: 0 | Status: ERX

## 2024-12-26 ASSESSMENT — LOCATION ZONE DERM: LOCATION ZONE: FACE

## 2024-12-26 ASSESSMENT — LOCATION DETAILED DESCRIPTION DERM: LOCATION DETAILED: LEFT INFERIOR CENTRAL MALAR CHEEK

## 2024-12-26 ASSESSMENT — LOCATION SIMPLE DESCRIPTION DERM: LOCATION SIMPLE: LEFT CHEEK

## 2024-12-26 NOTE — PROCEDURE: ISOTRETINOIN MONITORING
Hypercholesterolemia Monitoring: I explained this is common when taking isotretinoin. We will monitor closely.
Myalgia Monitoring: I explained this is common when taking isotretinoin. If this worsens they will contact us.
Is Cheilitis Present?: Yes - Normal Treatment
Show How Many Months Of Anticipated Therapy Are Left: No
Female Pregnancy Counseling Text: Female patients should also be on two forms of birth control while taking this medication and for one month after their last dose.
Hypertriglyceridemia Treatment: I explained this is common when taking isotretinoin. If this worsens they will contact us. They may try OTC ibuprofen.
Xerosis Normal Treatment: - Recommended application of Cetaphil or CeraVe cream numerous times a day and before going to bed to all dry areas.
Nosebleeds Normal Treatment: I explained this is common when taking isotretinoin. I recommended saline mist in each nostril multiple times a day. If this worsens they will contact us.
Retinoid Dermatitis Aggressive Treatment: I recommended more frequent application of Cetaphil or CeraVe to the areas of dermatitis. I also prescribed a topical steroid for twice daily use until the dermatitis resolves.
Kilograms Preamble Statement (Weight Entered In Details Tab): Reported Weight in kilograms:
Counseling Text: I reviewed the side effect in detail. Patient should get monthly blood tests, not donate blood, not drive at night if vision affected, and not share medication.
Pounds Preamble Statement (Weight Entered In Details Tab): Reported Weight in pounds:
Use Therapeutic Ranged Or Therapeutic Target: please select Range or Target
Female Completion Statement: After discussing her treatment course we decided to discontinue isotretinoin therapy at this time. I explained that she would need to continue her birth control methods for at least one month after the last dosage. She should also get a pregnancy test one month after the last dose. She shouldn't donate blood for one month after the last dose. She should call with any new symptoms of depression.
Male Completion Statement: - After discussing his treatment course we decided to discontinue isotretinoin therapy at this time.\\n- He shouldn't donate blood for one month after the last dose. He should call with any new symptoms of depression.\\n- Discussed maintenance options.
Cheilitis Normal Treatment: - Recommended application of Vaseline ointment or Aquaphor numerous times a day (as often as every hour) and before going to bed.
Target Cumulative Dosage (In Mg/Kg): 135
Detail Level: Zone
Xerosis Aggressive Treatment: I recommended application of Cetaphil or CeraVe numerous times a day going to bed to all dry areas. I also prescribed a topical steroid for twice daily use.
Are Labs Available For Review?: Yes
Next Month's Dosage: Continue Current Dosage
Weight Units: pounds
Cheilitis Normal Treatment: I recommended application of Vaseline or Aquaphor numerous times a day (as often as every hour) and before going to bed.
Cheilitis Aggressive Treatment: I recommended application of Vaseline or Aquaphor numerous times a day (as often as every hour) and before going to bed. I also prescribed a topical steroid for twice daily use.
Retinoid Dermatitis Normal Treatment: I recommended more frequent application of Cetaphil or CeraVe to the areas of dermatitis.
Headache Monitoring: I recommended monitoring the headaches for now. There is no evidence of increased intracranial pressure. They were instructed to call if the headaches are worsening.
Lower Range (In Mg/Kg): 120
Months Of Therapy Completed: 3
Upper Range (In Mg/Kg): 150
What Is The Patient's Gender: Male
Xerosis Aggressive Treatment: I recommended application of Cetaphil or CeraVe numerous times a day and before going to bed to all dry areas. I also prescribed a topical steroid for twice daily use.
Xerosis Normal Treatment: I recommended application of Cetaphil or CeraVe numerous times a day going to bed to all dry areas.

## 2024-12-26 NOTE — HPI: MEDICATION (ISOTRETINOIN)
How Severe Is It?: moderate
Is This A New Presentation, Or A Follow-Up?: Follow Up Isotretinoin
Additional History: No new pimples this past month. Patient is experiencing the side effect of dryness, but reports that the dryness is tolerable and mangeable. Patient is experiencing the side effect of cheilitis but this manageable. Patient denies headache, blurry vision, muscle aches, joint pains, any mood changes including denying depression/sadness and denies diarrhea or abdominal discomfort.

## 2025-01-28 ENCOUNTER — APPOINTMENT (OUTPATIENT)
Dept: URBAN - METROPOLITAN AREA CLINIC 252 | Age: 60
Setting detail: DERMATOLOGY
End: 2025-01-28

## 2025-01-28 DIAGNOSIS — L70.0 ACNE VULGARIS: ICD-10-CM

## 2025-01-28 DIAGNOSIS — F42.4 EXCORIATION (SKIN-PICKING) DISORDER: ICD-10-CM

## 2025-01-28 PROCEDURE — OTHER PRESCRIPTION: OTHER

## 2025-01-28 PROCEDURE — 99214 OFFICE O/P EST MOD 30 MIN: CPT

## 2025-01-28 PROCEDURE — OTHER ISOTRETINOIN MONITORING: OTHER

## 2025-01-28 PROCEDURE — OTHER COUNSELING: OTHER

## 2025-01-28 RX ORDER — KETOCONAZOLE 20 MG/G
CREAM TOPICAL
Qty: 60 | Refills: 11 | Status: ERX

## 2025-01-28 RX ORDER — ISOTRETINOIN 30 MG/1
30MG CAPSULE, LIQUID FILLED ORAL TWICE PER DAY
Qty: 60 | Refills: 0 | Status: ERX

## 2025-01-28 ASSESSMENT — LOCATION ZONE DERM: LOCATION ZONE: FACE

## 2025-01-28 ASSESSMENT — LOCATION DETAILED DESCRIPTION DERM
LOCATION DETAILED: INFERIOR MID FOREHEAD
LOCATION DETAILED: LEFT INFERIOR CENTRAL MALAR CHEEK

## 2025-01-28 ASSESSMENT — LOCATION SIMPLE DESCRIPTION DERM
LOCATION SIMPLE: LEFT CHEEK
LOCATION SIMPLE: INFERIOR FOREHEAD

## 2025-01-28 NOTE — PROCEDURE: COUNSELING
Patient Specific Counseling (Will Not Stick From Patient To Patient): NAC  mg bid for 2 weeks if picking is not under control increase to 1200 mg bid if no improvement after 3 months d/c
Detail Level: Detailed

## 2025-01-28 NOTE — HPI: MEDICATION (ISOTRETINOIN)
How Severe Is It?: mild
Is This A New Presentation, Or A Follow-Up?: Follow Up Isotretinoin
Additional History: He reports face improved with ketoconazole shampoo and cream. Skin is clear of acne. Only have wounds from picking.  Last picked a couple weeks ago.

## 2025-01-28 NOTE — PROCEDURE: ISOTRETINOIN MONITORING
Hypercholesterolemia Monitoring: I explained this is common when taking isotretinoin. We will monitor closely.
Is Cheilitis Present?: Yes - Normal Treatment
Pounds Preamble Statement (Weight Entered In Details Tab): Reported Weight in pounds:
Show Text Field For Brand Names Of Contraception?: Yes
Calculate Months Of Therapy Based On Documented Dosages (Will Hide Months Of Therapy Question)?: No
Retinoid Dermatitis Aggressive Treatment: I recommended more frequent application of Cetaphil or CeraVe to the areas of dermatitis. I also prescribed a topical steroid for twice daily use until the dermatitis resolves.
Myalgia Treatment: I explained this is common when taking isotretinoin. If this worsens they will contact us. They may try OTC ibuprofen.
What Is The Patient's Gender: Male
Nosebleeds Normal Treatment: I explained this is common when taking isotretinoin. I recommended saline mist in each nostril multiple times a day. If this worsens they will contact us.
Weight Units: pounds
Xerosis Normal Treatment: I recommended application of Cetaphil or CeraVe numerous times a day going to bed to all dry areas.
Female Completion Statement: After discussing her treatment course we decided to discontinue isotretinoin therapy at this time. I explained that she would need to continue her birth control methods for at least one month after the last dosage. She should also get a pregnancy test one month after the last dose. She shouldn't donate blood for one month after the last dose. She should call with any new symptoms of depression.
Headache Monitoring: I recommended monitoring the headaches for now. There is no evidence of increased intracranial pressure. They were instructed to call if the headaches are worsening.
Kilograms Preamble Statement (Weight Entered In Details Tab): Reported Weight in kilograms:
Myalgia Monitoring: I explained this is common when taking isotretinoin. If this worsens they will contact us.
Next Month's Dosage: Continue Current Dosage
Detail Level: Zone
Cheilitis Aggressive Treatment: I recommended application of Vaseline or Aquaphor numerous times a day (as often as every hour) and before going to bed. I also prescribed a topical steroid for twice daily use.
Male Completion Statement: - After discussing his treatment course we decided to discontinue isotretinoin therapy at this time.\\n- He shouldn't donate blood for one month after the last dose. He should call with any new symptoms of depression.\\n- Discussed maintenance options.
Xerosis Aggressive Treatment: I recommended application of Cetaphil or CeraVe numerous times a day going to bed to all dry areas. I also prescribed a topical steroid for twice daily use.
Months Of Therapy Completed: 4
Retinoid Dermatitis Normal Treatment: I recommended more frequent application of Cetaphil or CeraVe to the areas of dermatitis.
Comments: - pt can continue using ketoconazole cream bid as he feels this helps keep his acne at bay.
Xerosis Aggressive Treatment: I recommended application of Cetaphil or CeraVe numerous times a day and before going to bed to all dry areas. I also prescribed a topical steroid for twice daily use.
Use Therapeutic Ranged Or Therapeutic Target: please select Range or Target
Cheilitis Normal Treatment: I recommended application of Vaseline or Aquaphor numerous times a day (as often as every hour) and before going to bed.
Counseling Text: I reviewed the side effect in detail. Patient should get monthly blood tests, not donate blood, not drive at night if vision affected, and not share medication.
Upper Range (In Mg/Kg): 150
Female Pregnancy Counseling Text: Female patients should also be on two forms of birth control while taking this medication and for one month after their last dose.
Lower Range (In Mg/Kg): 120
Cheilitis Normal Treatment: - Recommended application of Vaseline ointment or Aquaphor numerous times a day (as often as every hour) and before going to bed.
Target Cumulative Dosage (In Mg/Kg): 135
Xerosis Normal Treatment: - Recommended application of Cetaphil or CeraVe cream numerous times a day and before going to bed to all dry areas.

## 2025-02-28 ENCOUNTER — APPOINTMENT (OUTPATIENT)
Dept: URBAN - METROPOLITAN AREA CLINIC 252 | Age: 60
Setting detail: DERMATOLOGY
End: 2025-02-28

## 2025-02-28 VITALS — HEIGHT: 64 IN | WEIGHT: 150 LBS

## 2025-02-28 DIAGNOSIS — L70.0 ACNE VULGARIS: ICD-10-CM

## 2025-02-28 PROCEDURE — OTHER PRESCRIPTION: OTHER

## 2025-02-28 PROCEDURE — 99214 OFFICE O/P EST MOD 30 MIN: CPT

## 2025-02-28 PROCEDURE — OTHER ADDITIONAL NOTES: OTHER

## 2025-02-28 PROCEDURE — OTHER ISOTRETINOIN MONITORING: OTHER

## 2025-02-28 RX ORDER — ISOTRETINOIN 30 MG/1
30MG CAPSULE, LIQUID FILLED ORAL TWICE PER DAY
Qty: 60 | Refills: 0 | Status: ERX

## 2025-02-28 ASSESSMENT — LOCATION DETAILED DESCRIPTION DERM: LOCATION DETAILED: LEFT INFERIOR CENTRAL MALAR CHEEK

## 2025-02-28 ASSESSMENT — LOCATION SIMPLE DESCRIPTION DERM: LOCATION SIMPLE: LEFT CHEEK

## 2025-02-28 ASSESSMENT — LOCATION ZONE DERM: LOCATION ZONE: FACE

## 2025-02-28 NOTE — PROCEDURE: ISOTRETINOIN MONITORING
Use Enhanced Counseling Feature (Automatic): No
Show Text Field For Brand Names Of Contraception?: Yes
Myalgia Monitoring: I explained this is common when taking isotretinoin. If this worsens they will contact us.
What Is The Patient's Gender: Male
Target Cumulative Dosage (In Mg/Kg): 135
Lower Range (In Mg/Kg): 120
Xerosis Normal Treatment: - Recommended application of Cetaphil or CeraVe cream numerous times a day and before going to bed to all dry areas.
Months Of Therapy Completed: 5
Cheilitis Aggressive Treatment: I recommended application of Vaseline or Aquaphor numerous times a day (as often as every hour) and before going to bed. I also prescribed a topical steroid for twice daily use.
Xerosis Normal Treatment: I recommended application of Cetaphil or CeraVe numerous times a day going to bed to all dry areas.
Detail Level: Zone
Hypertriglyceridemia Monitoring: I explained this is common when taking isotretinoin. We will monitor closely.
Retinoid Dermatitis Aggressive Treatment: I recommended more frequent application of Cetaphil or CeraVe to the areas of dermatitis. I also prescribed a topical steroid for twice daily use until the dermatitis resolves.
Female Completion Statement: After discussing her treatment course we decided to discontinue isotretinoin therapy at this time. I explained that she would need to continue her birth control methods for at least one month after the last dosage. She should also get a pregnancy test one month after the last dose. She shouldn't donate blood for one month after the last dose. She should call with any new symptoms of depression.
Headache Monitoring: I recommended monitoring the headaches for now. There is no evidence of increased intracranial pressure. They were instructed to call if the headaches are worsening.
Nosebleeds Normal Treatment: I explained this is common when taking isotretinoin. I recommended saline mist in each nostril multiple times a day. If this worsens they will contact us.
Male Completion Statement: - After discussing his treatment course we decided to discontinue isotretinoin therapy at this time.\\n- He shouldn't donate blood for one month after the last dose. He should call with any new symptoms of depression.\\n- Discussed maintenance options.
Upper Range (In Mg/Kg): 150
Kilograms Preamble Statement (Weight Entered In Details Tab): Reported Weight in kilograms:
Next Month's Dosage: Continue Current Dosage
Use Therapeutic Ranged Or Therapeutic Target: please select Range or Target
Hypertriglyceridemia Treatment: I explained this is common when taking isotretinoin. If this worsens they will contact us. They may try OTC ibuprofen.
Xerosis Aggressive Treatment: I recommended application of Cetaphil or CeraVe numerous times a day and before going to bed to all dry areas. I also prescribed a topical steroid for twice daily use.
Retinoid Dermatitis Normal Treatment: I recommended more frequent application of Cetaphil or CeraVe to the areas of dermatitis.
Cheilitis Normal Treatment: - Recommended application of Vaseline ointment or Aquaphor numerous times a day (as often as every hour) and before going to bed.
Female Pregnancy Counseling Text: Female patients should also be on two forms of birth control while taking this medication and for one month after their last dose.
Is Cheilitis Present?: Yes - Normal Treatment
Pounds Preamble Statement (Weight Entered In Details Tab): Reported Weight in pounds:
Xerosis Aggressive Treatment: I recommended application of Cetaphil or CeraVe numerous times a day going to bed to all dry areas. I also prescribed a topical steroid for twice daily use.
Weight Units: pounds
Cheilitis Normal Treatment: I recommended application of Vaseline or Aquaphor numerous times a day (as often as every hour) and before going to bed.
Counseling Text: I reviewed the side effect in detail. Patient should get monthly blood tests, not donate blood, not drive at night if vision affected, and not share medication.

## 2025-02-28 NOTE — HPI: MEDICATION (ISOTRETINOIN)
How Severe Is It?: moderate
Is This A New Presentation, Or A Follow-Up?: Follow Up Isotretinoin
Additional History: Patient is experiencing the side effect of dryness, but reports that the dryness is tolerable and mangeable. Patient is experiencing the side effect of cheilitis but this manageable. Patient denies headache, blurry vision, muscle aches, joint pains, any mood changes including denying depression/sadness and denies diarrhea or abdominal discomfort. Got 15-20 new pimpels this past month. Still reports picking.

## 2025-02-28 NOTE — PROCEDURE: ADDITIONAL NOTES
Additional Notes: - Recommended patient stop using Nizoral shampoo on the face and switch to using CeraVe hydrating cleanser instead. Patient provided with samples. \\n- Suggested he stop using a wash cloth to wash his face and instead use just his hands.\\n- Patient ok to continue using Ketoconazole cream for maintenance.
Detail Level: Simple
Render Risk Assessment In Note?: no

## 2025-03-03 RX ORDER — ISOTRETINOIN 30 MG/1
30MG CAPSULE, LIQUID FILLED ORAL TWICE PER DAY
Qty: 60 | Refills: 0 | Status: ERX

## 2025-03-31 ENCOUNTER — APPOINTMENT (OUTPATIENT)
Dept: URBAN - METROPOLITAN AREA CLINIC 252 | Age: 60
Setting detail: DERMATOLOGY
End: 2025-03-31

## 2025-03-31 VITALS — HEIGHT: 64 IN | WEIGHT: 150 LBS

## 2025-03-31 DIAGNOSIS — L70.0 ACNE VULGARIS: ICD-10-CM

## 2025-03-31 PROCEDURE — OTHER ISOTRETINOIN MONITORING: OTHER

## 2025-03-31 PROCEDURE — 99214 OFFICE O/P EST MOD 30 MIN: CPT

## 2025-03-31 PROCEDURE — OTHER PRESCRIPTION: OTHER

## 2025-03-31 RX ORDER — ISOTRETINOIN 20 MG/1
20MG CAPSULE, LIQUID FILLED ORAL
Qty: 30 | Refills: 0 | Status: ERX | COMMUNITY
Start: 2025-03-31

## 2025-03-31 ASSESSMENT — LOCATION DETAILED DESCRIPTION DERM: LOCATION DETAILED: LEFT INFERIOR CENTRAL MALAR CHEEK

## 2025-03-31 ASSESSMENT — LOCATION ZONE DERM: LOCATION ZONE: FACE

## 2025-03-31 ASSESSMENT — LOCATION SIMPLE DESCRIPTION DERM: LOCATION SIMPLE: LEFT CHEEK

## 2025-03-31 NOTE — PROCEDURE: ISOTRETINOIN MONITORING
Calculate Months Of Therapy Based On Documented Dosages (Will Hide Months Of Therapy Question)?: No
Male Completion Statement: - After discussing his treatment course we decided to discontinue isotretinoin therapy at this time.\\n- He shouldn't donate blood for one month after the last dose. He should call with any new symptoms of depression.\\n- Discussed maintenance options.
Detail Level: Zone
Headache Monitoring: I recommended monitoring the headaches for now. There is no evidence of increased intracranial pressure. They were instructed to call if the headaches are worsening.
Xerosis Normal Treatment: - Recommended application of Cetaphil or CeraVe cream numerous times a day and before going to bed to all dry areas.
Myalgia Monitoring: I explained this is common when taking isotretinoin. If this worsens they will contact us.
Comments: If pt still has any breakouts next month will plan to get fasting blood work
Include Validation In Note: Yes
Weight Units: pounds
Kilograms Preamble Statement (Weight Entered In Details Tab): Reported Weight in kilograms:
Female Pregnancy Counseling Text: Female patients should also be on two forms of birth control while taking this medication and for one month after their last dose.
Hypercholesterolemia Monitoring: I explained this is common when taking isotretinoin. We will monitor closely.
Upper Range (In Mg/Kg): 150
Next Month's Dosage: Continue Current Dosage
Months Of Therapy Completed: 6
Is Xerosis Present?: Yes - Normal Treatment
Cheilitis Aggressive Treatment: I recommended application of Vaseline or Aquaphor numerous times a day (as often as every hour) and before going to bed. I also prescribed a topical steroid for twice daily use.
Xerosis Aggressive Treatment: I recommended application of Cetaphil or CeraVe numerous times a day going to bed to all dry areas. I also prescribed a topical steroid for twice daily use.
Cheilitis Normal Treatment: I recommended application of Vaseline or Aquaphor numerous times a day (as often as every hour) and before going to bed.
Myalgia Treatment: I explained this is common when taking isotretinoin. If this worsens they will contact us. They may try OTC ibuprofen.
Nosebleeds Normal Treatment: I explained this is common when taking isotretinoin. I recommended saline mist in each nostril multiple times a day. If this worsens they will contact us.
Use Therapeutic Ranged Or Therapeutic Target: please select Range or Target
Target Cumulative Dosage (In Mg/Kg): 135
Retinoid Dermatitis Aggressive Treatment: I recommended more frequent application of Cetaphil or CeraVe to the areas of dermatitis. I also prescribed a topical steroid for twice daily use until the dermatitis resolves.
Female Completion Statement: After discussing her treatment course we decided to discontinue isotretinoin therapy at this time. I explained that she would need to continue her birth control methods for at least one month after the last dosage. She should also get a pregnancy test one month after the last dose. She shouldn't donate blood for one month after the last dose. She should call with any new symptoms of depression.
Pounds Preamble Statement (Weight Entered In Details Tab): Reported Weight in pounds:
Retinoid Dermatitis Normal Treatment: I recommended more frequent application of Cetaphil or CeraVe to the areas of dermatitis.
What Is The Patient's Gender: Male
Cheilitis Normal Treatment: - Recommended application of Vaseline ointment or Aquaphor numerous times a day (as often as every hour) and before going to bed.
Xerosis Normal Treatment: I recommended application of Cetaphil or CeraVe numerous times a day going to bed to all dry areas.
Counseling Text: I reviewed the side effect in detail. Patient should get monthly blood tests, not donate blood, not drive at night if vision affected, and not share medication.
Lower Range (In Mg/Kg): 120
Xerosis Aggressive Treatment: I recommended application of Cetaphil or CeraVe numerous times a day and before going to bed to all dry areas. I also prescribed a topical steroid for twice daily use.

## 2025-03-31 NOTE — HPI: MEDICATION (ISOTRETINOIN)
Is This A New Presentation, Or A Follow-Up?: Follow Up Isotretinoin
Additional History: Washes with dove and denies picking.

## 2025-04-11 ENCOUNTER — APPOINTMENT (OUTPATIENT)
Dept: URBAN - METROPOLITAN AREA CLINIC 252 | Age: 60
Setting detail: DERMATOLOGY
End: 2025-04-11

## 2025-04-11 VITALS — HEIGHT: 64 IN | WEIGHT: 150 LBS

## 2025-04-11 DIAGNOSIS — L98429 CHRONIC ULCER OF OTHER SPECIFIED SITES: ICD-10-CM

## 2025-04-11 DIAGNOSIS — L98419 CHRONIC ULCER OF OTHER SPECIFIED SITES: ICD-10-CM

## 2025-04-11 PROBLEM — L98.491 NON-PRESSURE CHRONIC ULCER OF SKIN OF OTHER SITES LIMITED TO BREAKDOWN OF SKIN: Status: ACTIVE | Noted: 2025-04-11

## 2025-04-11 PROCEDURE — OTHER ORDER TESTS: OTHER

## 2025-04-11 PROCEDURE — 99213 OFFICE O/P EST LOW 20 MIN: CPT

## 2025-04-11 PROCEDURE — OTHER COUNSELING: OTHER

## 2025-04-11 ASSESSMENT — LOCATION SIMPLE DESCRIPTION DERM: LOCATION SIMPLE: INFERIOR FOREHEAD

## 2025-04-11 ASSESSMENT — LOCATION DETAILED DESCRIPTION DERM: LOCATION DETAILED: INFERIOR MID FOREHEAD

## 2025-04-11 ASSESSMENT — LOCATION ZONE DERM: LOCATION ZONE: FACE

## 2025-04-11 NOTE — PROCEDURE: COUNSELING
Detail Level: Zone
Patient Specific Counseling (Will Not Stick From Patient To Patient): - Pt is concerned for infection. Advised infection is not likely but there could be a colonization. \\n- Recommended that patient avoid scrubbing the skin as this is likely contributing to open sores. \\n- Suggested patient cover the entire forehead with Aquaphor or Vanicream cream and cover with a bandana at night.  Provided patient with samples of Aquaphor in clinic today.\\n- Discussed with patient that micro-dosing for long-term maintenance of excessive oil is possible, but patient would have to be seen by a provider monthly. Patient asked about that today. \\n- If Staph bacteria is found on culture, will plan to send Mupirocin ointment.

## 2025-04-11 NOTE — PROCEDURE: ORDER TESTS
Billing Type: Third-Party Bill
Expected Date Of Service: 04/11/2025
Lab Facility: [id number]
Performing Laboratory: [id number]
Bill For Surgical Tray: no

## 2025-04-11 NOTE — HPI: [id number]
Is This A New Presentation, Or A Follow-Up?: Rash
Additional History: Started clindamycin lotion recently. These sting when touched.

## 2025-05-02 ENCOUNTER — APPOINTMENT (OUTPATIENT)
Dept: URBAN - METROPOLITAN AREA CLINIC 252 | Age: 60
Setting detail: DERMATOLOGY
End: 2025-05-02

## 2025-05-02 VITALS — HEIGHT: 64 IN | WEIGHT: 135 LBS

## 2025-05-02 DIAGNOSIS — L70.0 ACNE VULGARIS: ICD-10-CM

## 2025-05-02 PROCEDURE — OTHER ADDITIONAL NOTES: OTHER

## 2025-05-02 PROCEDURE — 99214 OFFICE O/P EST MOD 30 MIN: CPT

## 2025-05-02 PROCEDURE — OTHER PRESCRIPTION: OTHER

## 2025-05-02 PROCEDURE — OTHER VENIPUNCTURE: OTHER

## 2025-05-02 PROCEDURE — 36415 COLL VENOUS BLD VENIPUNCTURE: CPT

## 2025-05-02 PROCEDURE — OTHER MIPS QUALITY: OTHER

## 2025-05-02 PROCEDURE — OTHER ISOTRETINOIN MONITORING: OTHER

## 2025-05-02 PROCEDURE — OTHER ORDER TESTS: OTHER

## 2025-05-02 RX ORDER — ISOTRETINOIN 40 MG/1
CAPSULE, LIQUID FILLED ORAL
Qty: 30 | Refills: 0 | Status: ERX | COMMUNITY
Start: 2025-05-02

## 2025-05-02 ASSESSMENT — LOCATION DETAILED DESCRIPTION DERM
LOCATION DETAILED: RIGHT ANTECUBITAL SKIN
LOCATION DETAILED: LEFT INFERIOR CENTRAL MALAR CHEEK

## 2025-05-02 ASSESSMENT — LOCATION ZONE DERM
LOCATION ZONE: ARM
LOCATION ZONE: FACE

## 2025-05-02 ASSESSMENT — LOCATION SIMPLE DESCRIPTION DERM
LOCATION SIMPLE: LEFT CHEEK
LOCATION SIMPLE: RIGHT ELBOW

## 2025-06-06 ENCOUNTER — APPOINTMENT (OUTPATIENT)
Dept: URBAN - METROPOLITAN AREA CLINIC 252 | Age: 60
Setting detail: DERMATOLOGY
End: 2025-06-06

## 2025-06-06 VITALS — HEIGHT: 65 IN | WEIGHT: 150 LBS

## 2025-06-06 DIAGNOSIS — L70.0 ACNE VULGARIS: ICD-10-CM

## 2025-06-06 PROCEDURE — 99214 OFFICE O/P EST MOD 30 MIN: CPT

## 2025-06-06 PROCEDURE — OTHER ADDITIONAL NOTES: OTHER

## 2025-06-06 PROCEDURE — OTHER ISOTRETINOIN MONITORING: OTHER

## 2025-06-06 PROCEDURE — OTHER PRESCRIPTION: OTHER

## 2025-06-06 RX ORDER — ISOTRETINOIN 40 MG/1
40MG CAPSULE, LIQUID FILLED ORAL
Qty: 30 | Refills: 0 | Status: ERX

## 2025-06-06 ASSESSMENT — LOCATION ZONE DERM: LOCATION ZONE: FACE

## 2025-06-06 ASSESSMENT — LOCATION DETAILED DESCRIPTION DERM: LOCATION DETAILED: LEFT INFERIOR CENTRAL MALAR CHEEK

## 2025-06-06 ASSESSMENT — LOCATION SIMPLE DESCRIPTION DERM: LOCATION SIMPLE: LEFT CHEEK

## 2025-06-09 ENCOUNTER — RX ONLY (RX ONLY)
Age: 60
End: 2025-06-09

## 2025-06-09 RX ORDER — ISOTRETINOIN 40 MG/1
40MG CAPSULE, LIQUID FILLED ORAL
Qty: 30 | Refills: 0 | Status: ERX

## 2025-07-07 ENCOUNTER — APPOINTMENT (OUTPATIENT)
Dept: URBAN - METROPOLITAN AREA CLINIC 252 | Age: 60
Setting detail: DERMATOLOGY
End: 2025-07-07

## 2025-07-07 VITALS — WEIGHT: 150 LBS

## 2025-07-07 DIAGNOSIS — L70.0 ACNE VULGARIS: ICD-10-CM

## 2025-07-07 PROCEDURE — OTHER PRESCRIPTION: OTHER

## 2025-07-07 PROCEDURE — 99214 OFFICE O/P EST MOD 30 MIN: CPT

## 2025-07-07 PROCEDURE — OTHER ISOTRETINOIN MONITORING: OTHER

## 2025-07-07 RX ORDER — ISOTRETINOIN 20 MG/1
20MG CAPSULE, LIQUID FILLED ORAL
Qty: 30 | Refills: 0 | Status: ERX | COMMUNITY
Start: 2025-07-07

## 2025-07-07 ASSESSMENT — LOCATION DETAILED DESCRIPTION DERM: LOCATION DETAILED: LEFT INFERIOR CENTRAL MALAR CHEEK

## 2025-07-07 ASSESSMENT — LOCATION ZONE DERM: LOCATION ZONE: FACE

## 2025-07-07 ASSESSMENT — LOCATION SIMPLE DESCRIPTION DERM: LOCATION SIMPLE: LEFT CHEEK

## 2025-08-11 ENCOUNTER — APPOINTMENT (OUTPATIENT)
Dept: URBAN - METROPOLITAN AREA CLINIC 252 | Age: 60
Setting detail: DERMATOLOGY
End: 2025-08-11

## 2025-08-11 VITALS — WEIGHT: 150 LBS | HEIGHT: 64 IN

## 2025-08-11 DIAGNOSIS — L70.0 ACNE VULGARIS: ICD-10-CM

## 2025-08-11 PROCEDURE — OTHER ADDITIONAL NOTES: OTHER

## 2025-08-11 PROCEDURE — OTHER ISOTRETINOIN MONITORING: OTHER

## 2025-08-11 PROCEDURE — 99214 OFFICE O/P EST MOD 30 MIN: CPT

## 2025-08-11 PROCEDURE — OTHER PRESCRIPTION: OTHER

## 2025-08-11 RX ORDER — ISOTRETINOIN 20 MG/1
20MG CAPSULE, LIQUID FILLED ORAL
Qty: 30 | Refills: 0 | Status: ERX

## 2025-08-11 ASSESSMENT — LOCATION ZONE DERM: LOCATION ZONE: FACE

## 2025-08-11 ASSESSMENT — LOCATION DETAILED DESCRIPTION DERM: LOCATION DETAILED: LEFT INFERIOR CENTRAL MALAR CHEEK

## 2025-08-11 ASSESSMENT — LOCATION SIMPLE DESCRIPTION DERM: LOCATION SIMPLE: LEFT CHEEK
